# Patient Record
Sex: FEMALE | Race: WHITE | Employment: PART TIME | URBAN - METROPOLITAN AREA
[De-identification: names, ages, dates, MRNs, and addresses within clinical notes are randomized per-mention and may not be internally consistent; named-entity substitution may affect disease eponyms.]

---

## 2023-12-13 ENCOUNTER — NURSE ONLY (OUTPATIENT)
Dept: BARIATRICS/WEIGHT MGMT | Age: 23
End: 2023-12-13

## 2023-12-13 VITALS
DIASTOLIC BLOOD PRESSURE: 72 MMHG | SYSTOLIC BLOOD PRESSURE: 126 MMHG | BODY MASS INDEX: 46.82 KG/M2 | HEART RATE: 72 BPM | HEIGHT: 65 IN | WEIGHT: 281 LBS

## 2023-12-13 DIAGNOSIS — E66.01 MORBID OBESITY WITH BMI OF 40.0-44.9, ADULT (HCC): Primary | ICD-10-CM

## 2023-12-14 NOTE — PATIENT INSTRUCTIONS
Patient agreeable to communications with PCP. Body Mass Index:46.76    Type of Shake:120    Minimum food prescription of 3 shakes+2 entrees+5 vegetables/fruits and 64 ounces of non-caloric fluids reviewed with patient. Advised on caffeine use in moderation if at all. Reviewed how to order food. Patient verbalized understanding. Class scheduled reviewed.